# Patient Record
(demographics unavailable — no encounter records)

---

## 2024-12-16 NOTE — PHYSICAL EXAM
[Alert] : alert [No Acute Distress] : no acute distress [Normocephalic] : normocephalic [Anterior Bowie Closed] : anterior fontanelle closed [Red Reflex Bilateral] : red reflex bilateral [PERRL] : PERRL [Normally Placed Ears] : normally placed ears [Auricles Well Formed] : auricles well formed [Clear Tympanic membranes with present light reflex and bony landmarks] : clear tympanic membranes with present light reflex and bony landmarks [No Discharge] : no discharge [Nares Patent] : nares patent [Palate Intact] : palate intact [Uvula Midline] : uvula midline [Tooth Eruption] : tooth eruption  [Supple, full passive range of motion] : supple, full passive range of motion [No Palpable Masses] : no palpable masses [Symmetric Chest Rise] : symmetric chest rise [Clear to Auscultation Bilaterally] : clear to auscultation bilaterally [Regular Rate and Rhythm] : regular rate and rhythm [S1, S2 present] : S1, S2 present [No Murmurs] : no murmurs [+2 Femoral Pulses] : +2 femoral pulses [Soft] : soft [NonTender] : non tender [Non Distended] : non distended [Normoactive Bowel Sounds] : normoactive bowel sounds [No Hepatomegaly] : no hepatomegaly [No Splenomegaly] : no splenomegaly [Vince 1] : Vince 1 [No Clitoromegaly] : no clitoromegaly [Normal Vaginal Introitus] : normal vaginal introitus [Patent] : patent [Normally Placed] : normally placed [No Abnormal Lymph Nodes Palpated] : no abnormal lymph nodes palpated [No Clavicular Crepitus] : no clavicular crepitus [Negative Fitzpatrick-Ortalani] : negative Fitzpatrick-Ortalani [Symmetric Buttocks Creases] : symmetric buttocks creases [No Spinal Dimple] : no spinal dimple [NoTuft of Hair] : no tuft of hair [Cranial Nerves Grossly Intact] : cranial nerves grossly intact [No Rash or Lesions] : no rash or lesions

## 2024-12-16 NOTE — HISTORY OF PRESENT ILLNESS
[Cow's milk (Ounces per day ___)] : consumes [unfilled] oz of cow's milk per day [Fruit] : fruit [Vegetables] : vegetables [Meat] : meat [Eggs] : eggs [Finger Foods] : finger foods [Normal] : Normal [Toothpaste] : Primary Fluoride Source: Toothpaste [Playtime] : Playtime [No] : Not at  exposure [Water heater temperature set at <120 degrees F] : Water heater temperature set at <120 degrees F [Car seat in back seat] : Car seat in back seat [Carbon Monoxide Detectors] : Carbon monoxide detectors [Smoke Detectors] : Smoke detectors [Up to date] : Up to date [LastFluorideTreatment] : n/a [NO] : No

## 2025-03-17 NOTE — PHYSICAL EXAM

## 2025-03-17 NOTE — PHYSICAL EXAM

## 2025-03-17 NOTE — HISTORY OF PRESENT ILLNESS
[Cow's milk (Ounces per day ___)] : consumes [unfilled] oz of Cow's milk per day [Fruit] : fruit [Vegetables] : vegetables [Meat] : meat [Eggs] : eggs [Normal] : Normal [Toothpaste] : Primary Fluoride Source: Toothpaste [Playtime] : Playtime  [No] : No cigarette smoke exposure [Water heater temperature set at <120 degrees F] : Water heater temperature set at <120 degrees F [Car seat in back seat] : Car seat in back seat [Carbon Monoxide Detectors] : Carbon monoxide detectors [Smoke Detectors] : Smoke detectors [Up to date] : Up to date [LastFluorideTreatment] : n/a [NO] : No

## 2025-05-22 NOTE — HISTORY OF PRESENT ILLNESS
[de-identified] : Fever, pulling at ears, runny nose, eyes redness. [FreeTextEntry6] : Dad fever, pulling at era, runny nose, eyes redness, started Monday, Given Motrin/Tylenol yesterday morning.

## 2025-05-22 NOTE — HISTORY OF PRESENT ILLNESS
[de-identified] : Fever, pulling at ears, runny nose, eyes redness. [FreeTextEntry6] : Dad fever, pulling at era, runny nose, eyes redness, started Monday, Given Motrin/Tylenol yesterday morning.

## 2025-05-22 NOTE — DISCUSSION/SUMMARY
[FreeTextEntry1] : 20 m/o , today day 3 of fever  tm less today than prior two days , but woke up today with red eyes b/l no discharge likely viral syndrome possibly adenovirus tms clear well appearing , no rash continue supportive care if discharge/yellow crusting occurs on eyes- call office and i will prescribe polytrim monitor fever curve  if fever persists >5 days, worsening , or concern, call office

## 2025-05-22 NOTE — PHYSICAL EXAM
[Conjuctival Injection] : conjunctival injection [Clear Rhinorrhea] : clear rhinorrhea [Erythematous Oropharynx] : erythematous oropharynx [NL] : warm, clear